# Patient Record
Sex: MALE | Race: WHITE | URBAN - METROPOLITAN AREA
[De-identification: names, ages, dates, MRNs, and addresses within clinical notes are randomized per-mention and may not be internally consistent; named-entity substitution may affect disease eponyms.]

---

## 2024-06-13 ENCOUNTER — HOSPITAL ENCOUNTER (OUTPATIENT)
Age: 29
Discharge: HOME | End: 2024-06-13
Payer: COMMERCIAL

## 2024-06-13 VITALS
HEART RATE: 68 BPM | SYSTOLIC BLOOD PRESSURE: 122 MMHG | DIASTOLIC BLOOD PRESSURE: 59 MMHG | TEMPERATURE: 98 F | OXYGEN SATURATION: 98 % | RESPIRATION RATE: 17 BRPM

## 2024-06-13 DIAGNOSIS — J45.901 ASTHMA EXACERBATION, MILD (HCC): ICD-10-CM

## 2024-06-13 DIAGNOSIS — Z76.0 ENCOUNTER FOR MEDICATION REFILL: Primary | ICD-10-CM

## 2024-06-13 PROCEDURE — 99205 OFFICE O/P NEW HI 60 MIN: CPT

## 2024-06-13 PROCEDURE — 99203 OFFICE O/P NEW LOW 30 MIN: CPT

## 2024-06-13 RX ORDER — ALBUTEROL SULFATE 90 UG/1
2 AEROSOL, METERED RESPIRATORY (INHALATION) EVERY 4 HOURS PRN
Qty: 1 EACH | Refills: 0 | Status: SHIPPED | OUTPATIENT
Start: 2024-06-13 | End: 2024-07-13

## 2024-06-13 NOTE — ED PROVIDER NOTES
Patient Seen in: [facility] [place]      History     Chief Complaint   Patient presents with    Medication Request     Stated Complaint: med refill    Subjective:   HPI    28yo male presents w/need for medication refill. No pcp noted, states new to area and insurance has changed. Stated history of asthma, as a child. No recent hospitalizations for the same. Never intubated. States as of 2 years ago diagnosed w/covid and \"that seems to have increased my asthma symptoms.\" Noted to have been using albuterol inhaler 3-4 times daily. Last use was 3 days ago. Feels like seasonal allergies may be a trigger, also he found out recently he is allergic to pet, specifically dog dander and he lives w/dog in home. Is going on vacation and is scared of running out of med while out of town.   No other known medical issues. States is working w/insurance to establish care. No current wheezing/sob/iwob. Does not complete peak flows at home, is not sure of baseline.     Objective:   Past Medical History:    Asthma (HCC)    COVID-19              History reviewed. No pertinent surgical history.             No pertinent social history.            Review of Systems    Positive for stated complaint: med refill  Other systems are as noted in HPI.  Constitutional and vital signs reviewed.      All other systems reviewed and negative except as noted above.    Physical Exam     ED Triage Vitals [06/13/24 1328]   /59   Pulse 68   Resp 17   Temp 97.5 °F (36.4 °C)   Temp src Temporal   SpO2 98 %   O2 Device None (Room air)       Current Vitals:   Vital Signs  BP: 122/59  Pulse: 68  Resp: 17  Temp: 97.5 °F (36.4 °C)  Temp src: Temporal    Oxygen Therapy  SpO2: 98 %  O2 Device: None (Room air)            Physical Exam  Vitals and nursing note reviewed.   HENT:      Head: Normocephalic.      Right Ear: Tympanic membrane normal.      Left Ear: Tympanic membrane normal.      Nose: Nose normal.      Mouth/Throat:      Mouth: Mucous membranes  are moist.   Eyes:      Pupils: Pupils are equal, round, and reactive to light.   Cardiovascular:      Rate and Rhythm: Normal rate and regular rhythm.   Pulmonary:      Effort: Pulmonary effort is normal. No respiratory distress.      Breath sounds: Normal breath sounds. No wheezing.   Abdominal:      General: There is no distension.      Tenderness: There is no abdominal tenderness.   Musculoskeletal:         General: Normal range of motion.      Cervical back: Normal range of motion.   Skin:     General: Skin is warm and dry.   Neurological:      Mental Status: He is alert.               ED Course   Labs Reviewed - No data to display                   MDM                                         Medical Decision Making  30yo male presents w/need for medication refill. Physical exam here is normal, no wheezing, speaks in full sentences. Discussed starting on inhaled steroids and would like to wait bc not covered by insurance currently.   VSS-afebrile, pulse ox is normal. Doubt infectious process such as PNA clinically, likely triggered by dog dander v seasonal allergies.     Physical exam remained stable over serial reexaminations as previously documented. External chart review completed. No recent hospitalizations for the same.      I have discussed with the patient the results of tests, differential diagnosis, and warning signs and symptoms that should prompt immediate return.  The patient understands these instructions and agrees to the follow-up plan provided.  There are no barriers to learning.   Appropriate f/u given.  Patient agrees to return for any concerns/problems/complications.    Differential diagnosis reflecting the complexity of care include: See above    Comorbidities that add complexity to management include:na    External chart review was done and was noted:Yes    History obtained by an independent source was from: Patient    Discussions of management was done with:Patient    My independent  interpretation of studies of:na    Diagnostic tests and medications considered but not ordered were:xray    Social determinants of health that affect care:NA    Shared decision making was done by Self, Patient                Disposition and Plan     Clinical Impression:  1. Encounter for medication refill    2. Asthma exacerbation, mild (HCC)         Disposition:  Discharge  6/13/2024  1:40 pm    Follow-up:  Casey Cronin MD  [address] [facility] [address]  [phone]                Medications Prescribed:  Discharge Medication List as of 6/13/2024  1:49 PM        START taking these medications    Details   albuterol 108 (90 Base) MCG/ACT Inhalation Aero Soln Inhale 2 puffs into the lungs every 4 (four) hours as needed for Wheezing., Normal, Disp-1 each, R-0

## 2024-06-13 NOTE — ED INITIAL ASSESSMENT (HPI)
Patient states he needs a refill for his Albuterol. He is going on vacation and does not have much left in his current inhaler

## 2025-05-06 ENCOUNTER — HOSPITAL ENCOUNTER (OUTPATIENT)
Age: 30
Discharge: HOME | End: 2025-05-06
Payer: COMMERCIAL

## 2025-05-06 VITALS
TEMPERATURE: 98 F | OXYGEN SATURATION: 98 % | SYSTOLIC BLOOD PRESSURE: 130 MMHG | HEART RATE: 63 BPM | DIASTOLIC BLOOD PRESSURE: 51 MMHG | RESPIRATION RATE: 16 BRPM

## 2025-05-06 DIAGNOSIS — J45.901 MILD ASTHMA WITH ACUTE EXACERBATION, UNSPECIFIED WHETHER PERSISTENT (HCC): Primary | ICD-10-CM

## 2025-05-06 LAB — SARS-COV-2 RNA RESP QL NAA+PROBE: NOT DETECTED

## 2025-05-06 PROCEDURE — 99214 OFFICE O/P EST MOD 30 MIN: CPT

## 2025-05-06 PROCEDURE — 94640 AIRWAY INHALATION TREATMENT: CPT

## 2025-05-06 RX ORDER — ALBUTEROL SULFATE 90 UG/1
INHALANT RESPIRATORY (INHALATION) EVERY 6 HOURS PRN
COMMUNITY

## 2025-05-06 RX ORDER — PREDNISONE 20 MG/1
40 TABLET ORAL DAILY
Qty: 8 TABLET | Refills: 0 | Status: SHIPPED | OUTPATIENT
Start: 2025-05-06 | End: 2025-05-10

## 2025-05-06 RX ORDER — IPRATROPIUM BROMIDE AND ALBUTEROL SULFATE 2.5; .5 MG/3ML; MG/3ML
3 SOLUTION RESPIRATORY (INHALATION) ONCE
Status: COMPLETED | OUTPATIENT
Start: 2025-05-06 | End: 2025-05-06

## 2025-05-06 RX ORDER — PREDNISONE 20 MG/1
40 TABLET ORAL ONCE
Status: COMPLETED | OUTPATIENT
Start: 2025-05-06 | End: 2025-05-06

## 2025-05-06 NOTE — DISCHARGE INSTRUCTIONS
Continue using your inhaler or giving yourself nebulizer treatments every 4-6 hours.  Take the prednisone starting tomorrow as prescribed.  You may want to take an allergy medication daily.  Follow-up with your primary care provider or the primary care provider referred to.  Return for any concerns.

## 2025-05-06 NOTE — ED PROVIDER NOTES
He    Patient Seen in: [facility] [place]      History     Chief Complaint   Patient presents with    Cough/URI     Stated Complaint: Cough ,Asthma  Subjective:   30-year-old male with a history of asthma presents for wheezing intermittently for the past few weeks.  He states he was initially ill with URI symptoms, that resolved, and now he is experiencing intermittent wheezing.  He has an inhaler and a nebulizer at home to use as needed.  He states he does smoke marijuana in the evenings.  He denies any difficulty breathing.  No chest pain.  No nasal congestion.  No fevers.  When this occurs, he does develop a cough.  He is eating and drinking normally.  No sick contacts at home.  He states occasionally he takes a half of the Zyrtec because he does experience seasonal allergies.  No history of admission or intubation in the hospital due to asthma.  He appears nontoxic.      Objective:   Past Medical History:    Asthma (HCC)    COVID-19            History reviewed. No pertinent surgical history.           Social History     Socioeconomic History    Marital status:    Tobacco Use    Smoking status: Never    Smokeless tobacco: Never   Substance and Sexual Activity    Drug use: Yes     Types: Cannabis            Review of Systems    Positive for stated complaint: Cough/URI     Other systems are as noted in HPI.  Constitutional and vital signs reviewed.      All other systems reviewed and negative except as noted above.    Physical Exam     ED Triage Vitals [05/06/25 1223]   /51   Pulse 63   Resp 16   Temp 97.8 °F (36.6 °C)   Temp src Oral   SpO2 98 %   O2 Device None (Room air)     Current:/51   Pulse 63   Temp 97.8 °F (36.6 °C) (Oral)   Resp 16   SpO2 98%     Physical Exam  Vitals and nursing note reviewed.   Constitutional:       General: He is not in acute distress.     Appearance: Normal appearance. He is not toxic-appearing.   HENT:      Head: Normocephalic.      Right Ear: Tympanic  membrane normal.      Left Ear: Tympanic membrane normal.      Nose: No congestion or rhinorrhea.      Mouth/Throat:      Mouth: Mucous membranes are moist.      Pharynx: Oropharynx is clear. No oropharyngeal exudate or posterior oropharyngeal erythema.   Eyes:      Conjunctiva/sclera: Conjunctivae normal.      Pupils: Pupils are equal, round, and reactive to light.   Cardiovascular:      Rate and Rhythm: Normal rate and regular rhythm.   Pulmonary:      Breath sounds: Wheezing present.   Musculoskeletal:         General: Normal range of motion.      Cervical back: Normal range of motion and neck supple.   Skin:     General: Skin is warm and dry.      Capillary Refill: Capillary refill takes less than 2 seconds.   Neurological:      General: No focal deficit present.      Mental Status: He is alert and oriented to person, place, and time.   Psychiatric:         Mood and Affect: Mood normal.         Behavior: Behavior normal.         ED Course   No results found.  Labs Reviewed   RAPID SARS-COV-2 BY PCR - Normal       MDM     Medical Decision Making  The patient received 1 DuoNeb after his COVID test was resulted as negative.  His lungs are now clear to auscultation.  He received his first dose of prednisone.  I will prescribe him 4 more days.  We discussed taking a full allergy tablet daily.  His symptoms are most likely due to his asthma and seasonal allergies.  We discussed he should avoid smoking marijuana as this may negatively affect his lungs.  He will follow-up with his primary care provider or the primary care provider referred to him.    Amount and/or Complexity of Data Reviewed  Labs: ordered.     Details: COVID-negative    Risk  OTC drugs.  Prescription drug management.  Risk Details: Acute asthma exacerbation versus seasonal allergies versus COVID        Disposition and Plan     Clinical Impression:  1. Mild asthma with acute exacerbation, unspecified whether persistent ([facility])          Disposition:  Discharge  5/6/2025  1:17 pm    Follow-up:  Sara White MD  [address] [place] [address]  [phone]    Schedule an appointment as soon as possible for a visit   As needed          Medications Prescribed:  Discharge Medication List as of 5/6/2025  1:20 PM        START taking these medications    Details   predniSONE 20 MG Oral Tab Take 2 tablets (40 mg total) by mouth daily for 4 days., Normal, Disp-8 tablet, R-0

## 2025-05-06 NOTE — ED INITIAL ASSESSMENT (HPI)
Pt presents to the IC with c/o a dry cough for the last month, worse at night. No fevers. Pt has been using his albuterol inhaler and flonase with minimal relief.